# Patient Record
Sex: FEMALE | Race: WHITE | Employment: FULL TIME | URBAN - METROPOLITAN AREA
[De-identification: names, ages, dates, MRNs, and addresses within clinical notes are randomized per-mention and may not be internally consistent; named-entity substitution may affect disease eponyms.]

---

## 2018-06-20 PROCEDURE — 83516 IMMUNOASSAY NONANTIBODY: CPT | Performed by: FAMILY MEDICINE

## 2018-10-10 PROCEDURE — 87086 URINE CULTURE/COLONY COUNT: CPT | Performed by: FAMILY MEDICINE

## 2018-11-14 PROCEDURE — 87624 HPV HI-RISK TYP POOLED RSLT: CPT | Performed by: OBSTETRICS & GYNECOLOGY

## 2018-11-14 PROCEDURE — 88175 CYTOPATH C/V AUTO FLUID REDO: CPT | Performed by: OBSTETRICS & GYNECOLOGY

## 2021-05-15 ENCOUNTER — IMMUNIZATION (OUTPATIENT)
Dept: LAB | Facility: HOSPITAL | Age: 46
End: 2021-05-15
Attending: EMERGENCY MEDICINE
Payer: COMMERCIAL

## 2021-05-15 DIAGNOSIS — Z23 NEED FOR VACCINATION: Primary | ICD-10-CM

## 2021-05-15 PROCEDURE — 0001A SARSCOV2 VAC 30MCG/0.3ML IM: CPT

## 2021-06-19 ENCOUNTER — IMMUNIZATION (OUTPATIENT)
Dept: LAB | Facility: HOSPITAL | Age: 46
End: 2021-06-19
Attending: EMERGENCY MEDICINE
Payer: COMMERCIAL

## 2021-06-19 DIAGNOSIS — Z23 NEED FOR VACCINATION: Primary | ICD-10-CM

## 2021-06-19 PROCEDURE — 0002A SARSCOV2 VAC 30MCG/0.3ML IM: CPT

## 2021-12-14 ENCOUNTER — HOSPITAL ENCOUNTER (OUTPATIENT)
Facility: HOSPITAL | Age: 46
Setting detail: HOSPITAL OUTPATIENT SURGERY
End: 2021-12-14
Attending: SPECIALIST | Admitting: SPECIALIST

## 2021-12-31 VITALS — BODY MASS INDEX: 31.16 KG/M2 | HEIGHT: 65 IN | WEIGHT: 187 LBS

## 2021-12-31 RX ORDER — METOCLOPRAMIDE 10 MG/1
10 TABLET ORAL ONCE
Status: CANCELLED | OUTPATIENT
Start: 2021-12-31 | End: 2021-12-31

## 2021-12-31 RX ORDER — ACETAMINOPHEN 500 MG
1000 TABLET ORAL ONCE
Status: CANCELLED | OUTPATIENT
Start: 2021-12-31 | End: 2021-12-31

## 2021-12-31 RX ORDER — SODIUM CHLORIDE, SODIUM LACTATE, POTASSIUM CHLORIDE, CALCIUM CHLORIDE 600; 310; 30; 20 MG/100ML; MG/100ML; MG/100ML; MG/100ML
INJECTION, SOLUTION INTRAVENOUS CONTINUOUS
Status: CANCELLED | OUTPATIENT
Start: 2021-12-31

## 2021-12-31 RX ORDER — FAMOTIDINE 20 MG/1
20 TABLET, FILM COATED ORAL ONCE
Status: CANCELLED | OUTPATIENT
Start: 2021-12-31 | End: 2021-12-31

## 2022-01-03 ENCOUNTER — LAB ENCOUNTER (OUTPATIENT)
Dept: LAB | Facility: HOSPITAL | Age: 47
End: 2022-01-03
Attending: SPECIALIST
Payer: COMMERCIAL

## 2022-01-03 DIAGNOSIS — Z01.818 PREOP TESTING: ICD-10-CM

## 2022-01-04 LAB — SARS-COV-2 RNA RESP QL NAA+PROBE: DETECTED

## 2022-01-04 RX ORDER — CEFAZOLIN SODIUM/WATER 2 G/20 ML
2 SYRINGE (ML) INTRAVENOUS ONCE
Status: CANCELLED | OUTPATIENT
Start: 2022-01-05

## 2022-02-02 ENCOUNTER — HOSPITAL ENCOUNTER (OUTPATIENT)
Facility: HOSPITAL | Age: 47
Discharge: HOME | End: 2022-02-03
Attending: SPECIALIST | Admitting: SPECIALIST

## 2022-02-02 ENCOUNTER — ANESTHESIA EVENT (OUTPATIENT)
Dept: SURGERY | Facility: HOSPITAL | Age: 47
End: 2022-02-02

## 2022-02-02 ENCOUNTER — ANESTHESIA (OUTPATIENT)
Dept: SURGERY | Facility: HOSPITAL | Age: 47
End: 2022-02-02

## 2022-02-02 PROBLEM — M62.08 RECTUS DIASTASIS: Status: ACTIVE | Noted: 2022-02-02

## 2022-02-02 LAB — B-HCG UR QL: NEGATIVE

## 2022-02-02 PROCEDURE — 0J0M3ZZ ALTERATION OF LEFT UPPER LEG SUBCUTANEOUS TISSUE AND FASCIA, PERCUTANEOUS APPROACH: ICD-10-PCS | Performed by: SPECIALIST

## 2022-02-02 PROCEDURE — 0J080ZZ ALTERATION OF ABDOMEN SUBCUTANEOUS TISSUE AND FASCIA, OPEN APPROACH: ICD-10-PCS | Performed by: SPECIALIST

## 2022-02-02 PROCEDURE — 81025 URINE PREGNANCY TEST: CPT

## 2022-02-02 PROCEDURE — 0J083ZZ ALTERATION OF ABDOMEN SUBCUTANEOUS TISSUE AND FASCIA, PERCUTANEOUS APPROACH: ICD-10-PCS | Performed by: SPECIALIST

## 2022-02-02 PROCEDURE — 0J0L3ZZ ALTERATION OF RIGHT UPPER LEG SUBCUTANEOUS TISSUE AND FASCIA, PERCUTANEOUS APPROACH: ICD-10-PCS | Performed by: SPECIALIST

## 2022-02-02 RX ORDER — MORPHINE SULFATE 2 MG/ML
1 INJECTION, SOLUTION INTRAMUSCULAR; INTRAVENOUS EVERY 2 HOUR PRN
Status: DISCONTINUED | OUTPATIENT
Start: 2022-02-02 | End: 2022-02-03

## 2022-02-02 RX ORDER — MORPHINE SULFATE 10 MG/ML
6 INJECTION, SOLUTION INTRAMUSCULAR; INTRAVENOUS EVERY 10 MIN PRN
Status: DISCONTINUED | OUTPATIENT
Start: 2022-02-02 | End: 2022-02-02 | Stop reason: HOSPADM

## 2022-02-02 RX ORDER — ACETAMINOPHEN 325 MG/1
650 TABLET ORAL EVERY 6 HOURS PRN
Status: DISCONTINUED | OUTPATIENT
Start: 2022-02-02 | End: 2022-02-03

## 2022-02-02 RX ORDER — GLYCOPYRROLATE 0.2 MG/ML
INJECTION, SOLUTION INTRAMUSCULAR; INTRAVENOUS AS NEEDED
Status: DISCONTINUED | OUTPATIENT
Start: 2022-02-02 | End: 2022-02-02 | Stop reason: SURG

## 2022-02-02 RX ORDER — HALOPERIDOL 5 MG/ML
0.25 INJECTION INTRAMUSCULAR ONCE AS NEEDED
Status: DISCONTINUED | OUTPATIENT
Start: 2022-02-02 | End: 2022-02-02 | Stop reason: HOSPADM

## 2022-02-02 RX ORDER — MORPHINE SULFATE 2 MG/ML
2 INJECTION, SOLUTION INTRAMUSCULAR; INTRAVENOUS EVERY 2 HOUR PRN
Status: DISCONTINUED | OUTPATIENT
Start: 2022-02-02 | End: 2022-02-03

## 2022-02-02 RX ORDER — HYDROMORPHONE HYDROCHLORIDE 1 MG/ML
0.4 INJECTION, SOLUTION INTRAMUSCULAR; INTRAVENOUS; SUBCUTANEOUS EVERY 5 MIN PRN
Status: DISCONTINUED | OUTPATIENT
Start: 2022-02-02 | End: 2022-02-02 | Stop reason: HOSPADM

## 2022-02-02 RX ORDER — HYDROCODONE BITARTRATE AND ACETAMINOPHEN 5; 325 MG/1; MG/1
1 TABLET ORAL EVERY 6 HOURS PRN
Status: DISCONTINUED | OUTPATIENT
Start: 2022-02-02 | End: 2022-02-03

## 2022-02-02 RX ORDER — METOCLOPRAMIDE 10 MG/1
10 TABLET ORAL ONCE
Status: COMPLETED | OUTPATIENT
Start: 2022-02-02 | End: 2022-02-02

## 2022-02-02 RX ORDER — EPHEDRINE SULFATE 50 MG/ML
INJECTION, SOLUTION INTRAVENOUS AS NEEDED
Status: DISCONTINUED | OUTPATIENT
Start: 2022-02-02 | End: 2022-02-02 | Stop reason: SURG

## 2022-02-02 RX ORDER — NALOXONE HYDROCHLORIDE 0.4 MG/ML
80 INJECTION, SOLUTION INTRAMUSCULAR; INTRAVENOUS; SUBCUTANEOUS AS NEEDED
Status: DISCONTINUED | OUTPATIENT
Start: 2022-02-02 | End: 2022-02-02 | Stop reason: HOSPADM

## 2022-02-02 RX ORDER — HYDROCODONE BITARTRATE AND ACETAMINOPHEN 5; 325 MG/1; MG/1
1 TABLET ORAL AS NEEDED
Status: DISCONTINUED | OUTPATIENT
Start: 2022-02-02 | End: 2022-02-02 | Stop reason: HOSPADM

## 2022-02-02 RX ORDER — SODIUM CHLORIDE, SODIUM LACTATE, POTASSIUM CHLORIDE, CALCIUM CHLORIDE 600; 310; 30; 20 MG/100ML; MG/100ML; MG/100ML; MG/100ML
INJECTION, SOLUTION INTRAVENOUS CONTINUOUS
Status: DISCONTINUED | OUTPATIENT
Start: 2022-02-02 | End: 2022-02-02

## 2022-02-02 RX ORDER — PROCHLORPERAZINE EDISYLATE 5 MG/ML
5 INJECTION INTRAMUSCULAR; INTRAVENOUS ONCE AS NEEDED
Status: DISCONTINUED | OUTPATIENT
Start: 2022-02-02 | End: 2022-02-02 | Stop reason: HOSPADM

## 2022-02-02 RX ORDER — ONDANSETRON 2 MG/ML
8 INJECTION INTRAMUSCULAR; INTRAVENOUS EVERY 4 HOURS PRN
Status: DISCONTINUED | OUTPATIENT
Start: 2022-02-02 | End: 2022-02-03

## 2022-02-02 RX ORDER — PROCHLORPERAZINE EDISYLATE 5 MG/ML
10 INJECTION INTRAMUSCULAR; INTRAVENOUS EVERY 6 HOURS PRN
Status: DISCONTINUED | OUTPATIENT
Start: 2022-02-02 | End: 2022-02-03

## 2022-02-02 RX ORDER — CEFAZOLIN SODIUM/WATER 2 G/20 ML
2 SYRINGE (ML) INTRAVENOUS EVERY 8 HOURS
Status: COMPLETED | OUTPATIENT
Start: 2022-02-02 | End: 2022-02-03

## 2022-02-02 RX ORDER — CEFAZOLIN SODIUM/WATER 2 G/20 ML
2 SYRINGE (ML) INTRAVENOUS ONCE
Status: COMPLETED | OUTPATIENT
Start: 2022-02-02 | End: 2022-02-02

## 2022-02-02 RX ORDER — MORPHINE SULFATE 4 MG/ML
4 INJECTION, SOLUTION INTRAMUSCULAR; INTRAVENOUS EVERY 10 MIN PRN
Status: DISCONTINUED | OUTPATIENT
Start: 2022-02-02 | End: 2022-02-02 | Stop reason: HOSPADM

## 2022-02-02 RX ORDER — HYDROMORPHONE HYDROCHLORIDE 1 MG/ML
0.6 INJECTION, SOLUTION INTRAMUSCULAR; INTRAVENOUS; SUBCUTANEOUS EVERY 5 MIN PRN
Status: DISCONTINUED | OUTPATIENT
Start: 2022-02-02 | End: 2022-02-02 | Stop reason: HOSPADM

## 2022-02-02 RX ORDER — HYDROCODONE BITARTRATE AND ACETAMINOPHEN 5; 325 MG/1; MG/1
2 TABLET ORAL AS NEEDED
Status: DISCONTINUED | OUTPATIENT
Start: 2022-02-02 | End: 2022-02-02 | Stop reason: HOSPADM

## 2022-02-02 RX ORDER — ROCURONIUM BROMIDE 10 MG/ML
INJECTION, SOLUTION INTRAVENOUS AS NEEDED
Status: DISCONTINUED | OUTPATIENT
Start: 2022-02-02 | End: 2022-02-02 | Stop reason: SURG

## 2022-02-02 RX ORDER — ONDANSETRON 2 MG/ML
INJECTION INTRAMUSCULAR; INTRAVENOUS AS NEEDED
Status: DISCONTINUED | OUTPATIENT
Start: 2022-02-02 | End: 2022-02-02 | Stop reason: SURG

## 2022-02-02 RX ORDER — ACETAMINOPHEN 500 MG
1000 TABLET ORAL ONCE
Status: COMPLETED | OUTPATIENT
Start: 2022-02-02 | End: 2022-02-02

## 2022-02-02 RX ORDER — HYDROMORPHONE HYDROCHLORIDE 1 MG/ML
0.2 INJECTION, SOLUTION INTRAMUSCULAR; INTRAVENOUS; SUBCUTANEOUS EVERY 5 MIN PRN
Status: DISCONTINUED | OUTPATIENT
Start: 2022-02-02 | End: 2022-02-02 | Stop reason: HOSPADM

## 2022-02-02 RX ORDER — DEXAMETHASONE SODIUM PHOSPHATE 4 MG/ML
VIAL (ML) INJECTION AS NEEDED
Status: DISCONTINUED | OUTPATIENT
Start: 2022-02-02 | End: 2022-02-02 | Stop reason: SURG

## 2022-02-02 RX ORDER — NEOSTIGMINE METHYLSULFATE 1 MG/ML
INJECTION INTRAVENOUS AS NEEDED
Status: DISCONTINUED | OUTPATIENT
Start: 2022-02-02 | End: 2022-02-02 | Stop reason: SURG

## 2022-02-02 RX ORDER — DEXTROSE, SODIUM CHLORIDE, SODIUM LACTATE, POTASSIUM CHLORIDE, AND CALCIUM CHLORIDE 5; .6; .31; .03; .02 G/100ML; G/100ML; G/100ML; G/100ML; G/100ML
INJECTION, SOLUTION INTRAVENOUS CONTINUOUS
Status: DISCONTINUED | OUTPATIENT
Start: 2022-02-02 | End: 2022-02-03

## 2022-02-02 RX ORDER — SODIUM CHLORIDE, SODIUM LACTATE, POTASSIUM CHLORIDE, CALCIUM CHLORIDE 600; 310; 30; 20 MG/100ML; MG/100ML; MG/100ML; MG/100ML
INJECTION, SOLUTION INTRAVENOUS CONTINUOUS
Status: DISCONTINUED | OUTPATIENT
Start: 2022-02-02 | End: 2022-02-02 | Stop reason: HOSPADM

## 2022-02-02 RX ORDER — ASCORBIC ACID 500 MG
500 TABLET ORAL DAILY
COMMUNITY

## 2022-02-02 RX ORDER — TEMAZEPAM 15 MG/1
15 CAPSULE ORAL NIGHTLY PRN
Status: DISCONTINUED | OUTPATIENT
Start: 2022-02-02 | End: 2022-02-03

## 2022-02-02 RX ORDER — HYDROCODONE BITARTRATE AND ACETAMINOPHEN 10; 325 MG/1; MG/1
1 TABLET ORAL EVERY 4 HOURS PRN
Status: DISCONTINUED | OUTPATIENT
Start: 2022-02-02 | End: 2022-02-03

## 2022-02-02 RX ORDER — LIDOCAINE HYDROCHLORIDE 10 MG/ML
INJECTION, SOLUTION EPIDURAL; INFILTRATION; INTRACAUDAL; PERINEURAL AS NEEDED
Status: DISCONTINUED | OUTPATIENT
Start: 2022-02-02 | End: 2022-02-02 | Stop reason: SURG

## 2022-02-02 RX ORDER — FAMOTIDINE 20 MG/1
20 TABLET, FILM COATED ORAL ONCE
Status: COMPLETED | OUTPATIENT
Start: 2022-02-02 | End: 2022-02-02

## 2022-02-02 RX ORDER — SODIUM CHLORIDE 0.9 % (FLUSH) 0.9 %
10 SYRINGE (ML) INJECTION AS NEEDED
Status: DISCONTINUED | OUTPATIENT
Start: 2022-02-02 | End: 2022-02-03

## 2022-02-02 RX ORDER — BUPIVACAINE HYDROCHLORIDE 2.5 MG/ML
INJECTION, SOLUTION EPIDURAL; INFILTRATION; INTRACAUDAL AS NEEDED
Status: DISCONTINUED | OUTPATIENT
Start: 2022-02-02 | End: 2022-02-02

## 2022-02-02 RX ORDER — ONDANSETRON 2 MG/ML
4 INJECTION INTRAMUSCULAR; INTRAVENOUS ONCE AS NEEDED
Status: DISCONTINUED | OUTPATIENT
Start: 2022-02-02 | End: 2022-02-02 | Stop reason: HOSPADM

## 2022-02-02 RX ORDER — DIAPER,BRIEF,INFANT-TODD,DISP
EACH MISCELLANEOUS AS NEEDED
Status: DISCONTINUED | OUTPATIENT
Start: 2022-02-02 | End: 2022-02-02

## 2022-02-02 RX ORDER — MORPHINE SULFATE 4 MG/ML
2 INJECTION, SOLUTION INTRAMUSCULAR; INTRAVENOUS EVERY 10 MIN PRN
Status: DISCONTINUED | OUTPATIENT
Start: 2022-02-02 | End: 2022-02-02 | Stop reason: HOSPADM

## 2022-02-02 RX ADMIN — LIDOCAINE HYDROCHLORIDE 50 MG: 10 INJECTION, SOLUTION EPIDURAL; INFILTRATION; INTRACAUDAL; PERINEURAL at 11:37:00

## 2022-02-02 RX ADMIN — SODIUM CHLORIDE, SODIUM LACTATE, POTASSIUM CHLORIDE, CALCIUM CHLORIDE: 600; 310; 30; 20 INJECTION, SOLUTION INTRAVENOUS at 13:38:00

## 2022-02-02 RX ADMIN — GLYCOPYRROLATE 0.6 MG: 0.2 INJECTION, SOLUTION INTRAMUSCULAR; INTRAVENOUS at 13:19:00

## 2022-02-02 RX ADMIN — CEFAZOLIN SODIUM/WATER 2 G: 2 G/20 ML SYRINGE (ML) INTRAVENOUS at 11:49:00

## 2022-02-02 RX ADMIN — NEOSTIGMINE METHYLSULFATE 5 MG: 1 INJECTION INTRAVENOUS at 13:19:00

## 2022-02-02 RX ADMIN — EPHEDRINE SULFATE 10 MG: 50 INJECTION, SOLUTION INTRAVENOUS at 12:09:00

## 2022-02-02 RX ADMIN — DEXAMETHASONE SODIUM PHOSPHATE 4 MG: 4 MG/ML VIAL (ML) INJECTION at 11:46:00

## 2022-02-02 RX ADMIN — ROCURONIUM BROMIDE 50 MG: 10 INJECTION, SOLUTION INTRAVENOUS at 11:37:00

## 2022-02-02 RX ADMIN — ONDANSETRON 4 MG: 2 INJECTION INTRAMUSCULAR; INTRAVENOUS at 11:46:00

## 2022-02-02 NOTE — BRIEF OP NOTE
Pre-Operative Diagnosis: excess skin/fat of abdomen, hips and mons pubis     Post-Operative Diagnosis: excess skin/fat of abdomen, hips and mons pubis      Procedure Performed:   abdominoplasty with liposuction of hips and mons pubis with ANTONIO drain and pain pump placement    Surgeon(s) and Role:     Laura Guzman MD - Primary    Assistant(s):  Surgical Assistant.: Colleen Holter     Surgical Findings: Nothing unusual     Specimen: None     Estimated Blood Loss: Blood Output: 50 mL (2/2/2022  1:56 PM)      Dictation Number:  Dictated    Francesca Garcia MD  2/2/2022  2:05 PM

## 2022-02-02 NOTE — OR NURSING
Mark Blanca Patient Status:  Outpatient in a Bed    1975 MRN [id number]   Location [address] [facility] Attending January Oneal MD   Hosp Day # 0 PCP Papito Salomon MD     Patient is unable to remove jewelry from bilateral ear. Patient is aware of risks of proceeding with surgery with jewelry in place. Jewelry taped. Patient's LDL cholesterol from March 2020 was 112. More recently, it is elevated at 148. Dietary modificaitons were touched upon. Discussed importance of at least 150 minutes of exercise per week at moderate exertion. Repeat lipid panel ordered.

## 2022-02-02 NOTE — DISCHARGE SUMMARY
[facility]    Discharge Summary    Akil Mcdonald Patient Status:  Outpatient in a Bed    1975 MRN [id number]   Location [address] [facility] Attending Uzair Randolph MD    Day # 0 PCP Vivi Domingo MD     Date of Admission: 2022   Date of Discharge: 2/3/2022    Admitting Diagnosis: excess skin/fat of abdomen, hips and mons pubis rectus diastasis    Disposition: Discharge to home    Discharge Diagnosis: excess skin/fat of abdomen, hips and mons pubis rectus diastasis    Hospital Course:   Reason for Admission: excess skin/fat of abdomen, hips and mons pubis  And rectus diastasis      Surgical Procedures     Case IDs Date Procedure Surgeon Location Status    [id number] 22 abdominoplasty with liposuction of hips and mons pubis with ANTONIO drain and pain pump placement Uzair Randolph MD [facility] Course: Uncomplicated    Complications: None        Discharge Plan:   Discharge Condition:Good    Current Discharge Medication List    Home Meds - Unchanged    Vitamin C 500 MG Oral Tab  Take 500 mg by mouth daily. Cholecalciferol (VITAMIN D) 1000 units Oral Tab  Take by mouth. Discharge Diet: Low sodium diet (1500mg/day)    Discharge Activity: As tolerated    Follow up: Follow-up Information     Uzair Randolph MD In 1 day. Specialty: SURGERY, PLASTIC  Why: Pain pump removal  Contact information:  [place]  [phone]                             Other Discharge Instructions:       ANTONIO drain care as instructed - empty, record and strip NATONIO drains as needed to keep a kink in the ANTONIO bulb. Please reinforce ANTONIO drain sites with 4x4 gauze as needed if there is drainage from under the clear plastic adhesive dressing. Do not remove any plastic adhesive dressings covering ANTONIO drain skin sites or pain pump sites. May remove garment briefly if needed, then replace promptly.   Begin antibiotics tonight as instructed; Pain medication as needed for pain. Ambulate and deep breathe regularly. Hold on shower until ANTONIO drains removed. May sponge bath as needed. Return to clinic as scheduled.         Lisa Coyle MD  2/2/2022

## 2022-02-02 NOTE — ANESTHESIA PROCEDURE NOTES
Airway  Date/Time: 2/2/2022 11:39 AM  Urgency: Elective    Airway not difficult    General Information and Staff    Patient location during procedure: OR  Anesthesiologist: Rhoda Bo MD  Resident/CRNA: Ishaan Lopez CRNA  Performed: CRNA     Indications and Patient Condition  Indications for airway management: anesthesia  Sedation level: deep  Preoxygenated: yes  Patient position: sniffing  Mask difficulty assessment: 1 - vent by mask    Final Airway Details  Final airway type: endotracheal airway      Successful airway: ETT  Cuffed: yes   Successful intubation technique: direct laryngoscopy  Endotracheal tube insertion site: oral  Blade: Elisabet  Blade size: #3  ETT size (mm): 7.0    Cormack-Lehane Classification: grade I - full view of glottis  Placement verified by: chest auscultation and capnometry   Measured from: lips  ETT to lips (cm): 21  Number of attempts at approach: 1  Number of other approaches attempted: 0

## 2022-02-03 VITALS
RESPIRATION RATE: 16 BRPM | HEIGHT: 65 IN | HEART RATE: 63 BPM | DIASTOLIC BLOOD PRESSURE: 66 MMHG | OXYGEN SATURATION: 98 % | BODY MASS INDEX: 31.99 KG/M2 | SYSTOLIC BLOOD PRESSURE: 115 MMHG | WEIGHT: 192 LBS | TEMPERATURE: 98 F

## 2022-02-03 PROBLEM — M62.08 RECTUS DIASTASIS: Status: RESOLVED | Noted: 2022-02-02 | Resolved: 2022-02-03

## 2022-02-03 NOTE — OPERATIVE REPORT
[facility]    PATIENT'S NAME: Lavonne Awan   ATTENDING PHYSICIAN: Feliciano Kimball MD   OPERATING PHYSICIAN: Feliciano Kimball MD   PATIENT ACCOUNT#:   [id number]    LOCATION:  [address]  MEDICAL RECORD #:   [id number]       YOB: 1975  ADMISSION DATE:       02/02/2022      OPERATION DATE:  02/02/2022    OPERATIVE REPORT    PREOPERATIVE DIAGNOSIS:  Excess skin and fatty tissue of the anterior abdominal wall, the hips, and the mons pubis. POSTOPERATIVE DIAGNOSIS:  Excess skin and fatty tissue of the anterior abdominal wall, the hips, and the mons pubis. PROCEDURE:  Full abdominoplasty with liposuction of the hips and the mons pubis. ASSISTANT:  Chiquis Gresham CSA    ANESTHESIA:  General endotracheal anesthesia. ESTIMATED BLOOD LOSS:  50 mL. INTRAVENOUS FLUIDS:  2000 mL of crystalloid. COMPLICATIONS:  None. DRAINS PLACED:  Two 7 mm flat ANTONIO drains in the lower abdominal area and 2 On-Q catheters in the abdominal area for postop pain control. DISPOSITION:  Patient tolerated the procedure well, was awakened from anesthesia, and transferred to Recovery in good condition. INDICATIONS:  This is a 74-year-old patient who presented to my office with excess skin and fatty tissue of the anterior abdominal wall, the hips, and the mons pubis. She requested a full abdominoplasty with liposuction of the hips and the mons pubis.   We discussed the operation in detail at 2 separate preoperative visits, and she was made aware of the risks of bleeding, infection, scarring, asymmetry, contour irregularities, wound dehiscence with secondary wound healing, skin flap necrosis with secondary wound healing, umbilical necrosis with the need for secondary wound healing, skin flap sensory loss that can be permanent in nature, umbilical sensory loss that can be permanent in nature, overall dissatisfaction with the final aesthetic outcome, the need for revisional surgery, the use of ANTONIO drains and pain pump with the operation, and the rare but possible risk of DVT, PE, MI, COVID-19 infection, death, and understanding of these risks and limitations, she wished to proceed. I answered all her questions in detail regarding the procedure at the 2 separate preoperative visits and in the preop holding area. Once all questions were answered and consents obtained, the procedure went as follows. OPERATIVE TECHNIQUE:  Patient was taken to the operating, placed on the table in supine position. General endotracheal anesthesia was induced without any complications. The anterior abdominal wall, hips, and mons pubis were prepped and draped in normal sterile fashion. At the beginning the procedure, approximately 1 L of tumescent solution was infiltrated throughout the anterior abdominal wall, the pannus, the hips, and the mons pubis area. At this point, a 4.0 mm liposuction cannula was used to lipoaspirate a total of 1000 mL which was approximately 450 mL from each hip area and 100 mL from the mons pubis area. The sides were examined for symmetry, found to have excellent shape and symmetry. At this point, the lower abdominal incision line was made with a 10 blade. Dissection was carried down through skin and subcutaneous tissue until the underlying rectus fascia was identified. Dissection was then carried down to the anterior surface of the rectus fascia extending superiorly to the level of the umbilicus. At the umbilicus, the umbilicus was incised and left adherent to the abdominal wall with a healthy stalk of vascularized tissue, and the skin flap was elevated off the underlying abdominal wall further superiorly to the level of the xiphoid process staying over the rectus muscles to avoid over dissection of the flap. At this point, dissection cavity was irrigated multiple times with warm antibiotic irrigation as well as with Betadine.   Bipolar was used for hemostasis throughout. The rectus diastasis was marked and imbricated using 3 layers of looped 0 nylon suture, first in the supraumbilical then in the infraumbilical area. Once the abdominal wall was tautly reconstructed, stab incisions were made in the groin for placement of 7 mm flat ANTONIO drains which were secured with 3-0 nylon sutures and then the pain pumps were placed into the abdominal cavity through stab incisions of the upper thigh region. At this point, the bed was flexed, and the skin flap was brought down over the reconstructed abdominal wall. The distal aspect was marked and removed sharply. There was bright red bleeding at the distal aspect of the skin flap. Bipolar was used for hemostasis along the fresh incisional edges, and then the skin flap was brought down and sewn in place with 2-0 Vicryl in Seth fascia, 2-0 Vicryl sutures to the deeper dermis, and a 4-0 Prolene was used to close the superficial dermis in a subcuticular fashion. At the level of the umbilicus, a vertically-oriented incision was made. The skin and underlying subcutaneous tissue was removed sharply. Bipolar was used for hemostasis along the fresh incisional edge, and then the original umbilicus which was pink and viable with no evidence of venous congestion or ischemia was brought out and sewn in place with 3-0 Vicryl and 4-0 Prolene. Mastisol and Steri-Strips were placed on the final incision line. Mastisol, Steri-Strips, Biopatch, and Tegaderm were used to secure the ANTONIO drains and pain pumps. Bacitracin and 2 x 2 gauze, Mastisol, and Tegaderm were placed over the umbilicus. The patient was then placed into a compressive abdominal binder, awakened from anesthesia, and transferred to the recovery room in good condition. She will be admitted for an overnight 23-hour observation. Dictated By Farhat Kimball MD  d: 02/02/2022 14:01:19  t: 02/02/2022 15:13:00  Job [id number]  VOO/

## (undated) DIAGNOSIS — Z01.818 PREOP TESTING: Primary | ICD-10-CM

## (undated) DEVICE — ADHESIVE MASTISOL 2/3CC VL

## (undated) DEVICE — SUTURE VICRYL 3-0 J497G

## (undated) DEVICE — SYSTEM PAIN PMP 4ML/HR ONQ

## (undated) DEVICE — 3M™ TEGADERM™ TRANSPARENT FILM DRESSING, 1626W, 4 IN X 4-3/4 IN (10 CM X 12 CM), 50 EACH/CARTON, 4 CARTON/CASE: Brand: 3M™ TEGADERM™

## (undated) DEVICE — CG INFILTRATION TUBING: Brand: CG INFILTRATION TUBING

## (undated) DEVICE — DRAIN INCS 7MM 20CMX7MM SIL

## (undated) DEVICE — SUTURE VICRYL 5-0 P-3

## (undated) DEVICE — PROXIMATE SKIN STAPLERS (35 WIDE) CONTAINS 35 STAINLESS STEEL STAPLES (FIXED HEAD): Brand: PROXIMATE

## (undated) DEVICE — GOWN SURG AERO BLUE PERF LG

## (undated) DEVICE — MAJOR GENERAL: Brand: MEDLINE INDUSTRIES, INC.

## (undated) DEVICE — INTENDED FOR TISSUE SEPARATION, AND OTHER PROCEDURES THAT REQUIRE A SHARP SURGICAL BLADE TO PUNCTURE OR CUT.: Brand: BARD-PARKER ® STAINLESS STEEL BLADES

## (undated) DEVICE — 9534HP TRANSPARENT DRSG W/FRAME: Brand: 3M™ TEGADERM™

## (undated) DEVICE — 3M™ STERI-DRAPE™ INSTRUMENT POUCH 1018: Brand: STERI-DRAPE™

## (undated) DEVICE — 3M™ STERI-STRIP™ REINFORCED ADHESIVE SKIN CLOSURES, R1547, 1/2 IN X 4 IN (12 MM X 100 MM), 6 STRIPS/ENVELOPE: Brand: 3M™ STERI-STRIP™

## (undated) DEVICE — GAMMEX® PI HYBRID SIZE 6.5, STERILE POWDER-FREE SURGICAL GLOVE, POLYISOPRENE AND NEOPRENE BLEND: Brand: GAMMEX

## (undated) DEVICE — SUTURE VICRYL 2-0 FS-1

## (undated) DEVICE — ABDOMINAL BINDER: Brand: DEROYAL

## (undated) DEVICE — SPONGE LAP 18X18IN 7IN LOOP

## (undated) DEVICE — APPLICATOR COTTON TIP 6" 2/PK

## (undated) DEVICE — SOL  .9 1000ML BTL

## (undated) DEVICE — DRAIN RESERVOIR RELIAVAC 100CC

## (undated) DEVICE — DRAPE SHEET LG

## (undated) DEVICE — TRAY SKIN PREP PVP-1

## (undated) DEVICE — SUCTION CANISTER, 3000CC,SAFELINER: Brand: DEROYAL

## (undated) DEVICE — DRESSING BIOPATCH 1X4 CNTR

## (undated) DEVICE — SUTURE PROLENE 4-0 FS-2

## (undated) DEVICE — ACCUVAC SMOKE ATTACHMENT

## (undated) DEVICE — GAUZE SPONGES,12 PLY: Brand: CURITY

## (undated) DEVICE — BANDAGE ROLL,100% COTTON, 6 PLY, LARGE: Brand: KERLIX

## (undated) DEVICE — PSI-TEC TUBING: Brand: PSI-TEC TUBING

## (undated) DEVICE — VIOLET BRAIDED (POLYGLACTIN 910), SYNTHETIC ABSORBABLE SUTURE: Brand: COATED VICRYL

## (undated) DEVICE — SUTURE PROLENE 5-0 P-3

## (undated) DEVICE — 3M™ BAIR HUGGER® UNDERBODY BLANKET, FULL ACCESS, 10 PER CASE 63500: Brand: BAIR HUGGER™

## (undated) DEVICE — SUTURE ETHILON 3-0 669H

## (undated) DEVICE — CASED DISP BIPOLAR CORD

## (undated) DEVICE — SUTURE ETH 0 CT 48IN MFL LOOP

## (undated) DEVICE — TOWEL SURG OR 17X30IN BLUE